# Patient Record
Sex: FEMALE | Race: BLACK OR AFRICAN AMERICAN
[De-identification: names, ages, dates, MRNs, and addresses within clinical notes are randomized per-mention and may not be internally consistent; named-entity substitution may affect disease eponyms.]

---

## 2020-10-17 ENCOUNTER — HOSPITAL ENCOUNTER (EMERGENCY)
Dept: HOSPITAL 63 - ER | Age: 25
Discharge: HOME | End: 2020-10-17
Payer: OTHER GOVERNMENT

## 2020-10-17 VITALS — BODY MASS INDEX: 35.96 KG/M2 | HEIGHT: 61 IN | WEIGHT: 190.48 LBS

## 2020-10-17 VITALS — SYSTOLIC BLOOD PRESSURE: 148 MMHG | DIASTOLIC BLOOD PRESSURE: 81 MMHG
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
## 2020-10-17 DIAGNOSIS — S39.012A: Primary | ICD-10-CM

## 2020-10-17 DIAGNOSIS — M25.562: ICD-10-CM
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
## 2020-10-17 LAB
APTT PPP: (no result) S
BACTERIA #/AREA URNS HPF: 0 /HPF
BILIRUB UR QL STRIP: (no result)
FIBRINOGEN PPP-MCNC: CLEAR MG/DL
GLUCOSE UR STRIP-MCNC: (no result) MG/DL
NITRITE UR QL STRIP: (no result)
RBC #/AREA URNS HPF: (no result) /HPF (ref 0–2)
SP GR UR STRIP: 1.01
SQUAMOUS #/AREA URNS LPF: (no result) /LPF
U PREG PATIENT: NEGATIVE
UROBILINOGEN UR-MCNC: 0.2 MG/DL
WBC #/AREA URNS HPF: (no result) /HPF (ref 0–4)

## 2020-10-17 PROCEDURE — 81025 URINE PREGNANCY TEST: CPT

## 2020-10-17 PROCEDURE — 72100 X-RAY EXAM L-S SPINE 2/3 VWS: CPT

## 2020-10-17 PROCEDURE — 81001 URINALYSIS AUTO W/SCOPE: CPT

## 2020-10-17 PROCEDURE — 73562 X-RAY EXAM OF KNEE 3: CPT

## 2020-10-17 PROCEDURE — 99284 EMERGENCY DEPT VISIT MOD MDM: CPT

## 2020-10-17 NOTE — RAD
Exam: Left knee 3 views

 

INDICATION: Fall, history of meniscus surgery

 

TECHNIQUE: Frontal, lateral and oblique views of the left knee

 

Comparisons: None

 

FINDINGS:

Chronic fracture deformity at the proximal tibia and fibula. Bone 

mineralization is normal. No acute fractures seen. There is a small 

suprapatellar effusion. Joint spaces are well-maintained.

 

IMPRESSION:

No acute osseous abnormality.

 

Electronically signed by: Deanna Mart MD (10/17/2020 7:33 PM) [id number]

## 2020-10-17 NOTE — PHYS DOC
Past History


Past Medical History:  No Pertinent History


 (FLO SAAVEDRA MD)


Past Surgical History:  Other


 (FLO SAAVEDRA MD)


Alcohol Use:  None


 (FLO SAAVEDRA MD)





General Adult


EDM:


Chief Complaint:  MECHANICAL FALL





HPI:


HPI:





25-year-old female with low back pain that radiates down to her left thigh and 

knee.  4 days ago she was pulling a tent and fell onto her back with her leg 

bent to the side.  Did not have much pain at the time but the pain has been 

getting worse and cramping more.  No other injuries, no paresthesias.  Has a 

history of surgery on her left knee in the past.  No history of chronic back 

pain, no changes in urination or hematuria


 (FLO SAAVEDRA MD)





Review of Systems:


Review of Systems:


Constitutional:  Denies fever or chills 


Eyes:  Denies change in visual acuity 


HENT:  Denies nasal congestion or sore throat 


Respiratory:  Denies cough or shortness of breath 


Cardiovascular:  Denies chest pain or edema 


GI:  Denies abdominal pain, nausea, vomiting, bloody stools or diarrhea 


: Denies dysuria 


Musculoskeletal:  Denies back pain or joint pain 


Integument:  Denies rash 


Neurologic:  Denies headache, focal weakness or sensory changes 


Endocrine:  Denies polyuria or polydipsia 


Lymphatic:  Denies swollen glands 


Psychiatric:  Denies depression or anxiety


 (FLO SAAVEDRA MD)





Heart Score:


Risk Factors:


Risk Factors:  DM, Current or recent (<one month) smoker, HTN, HLP, family 

history of CAD, obesity.


Risk Scores:


Score 0 - 3:  2.5% MACE over next 6 weeks - Discharge Home


Score 4 - 6:  20.3% MACE over next 6 weeks - Admit for Clinical Observation


Score 7 - 10:  72.7% MACE over next 6 weeks - Early Invasive Strategies


 (FLO SAAVEDRA MD)





Allergies:


Allergies:





Allergies








Coded Allergies Type Severity Reaction Last Updated Verified


 


  No Known Drug Allergies    10/17/20 No








 (FLO SAAVEDRA MD)





Physical Exam:


PE:





Constitutional: Well developed, well nourished, no acute distress, non-toxic 

appearance. []


HENT: Normocephalic, atraumatic, bilateral external ears normal, oropharynx 

moist, no oral exudates, nose normal. []


Eyes: PERRLA, EOMI, conjunctiva normal, no discharge. [] 


Neck: Normal range of motion, no tenderness, supple, no stridor. [] 


Cardiovascular:Heart rate regular rhythm, no murmur []


Lungs & Thorax:  Bilateral breath sounds clear to auscultation []


Abdomen: Bowel sounds normal, soft, no tenderness, no masses, no pulsatile 

masses. [] 


Skin: Warm, dry, no erythema, no rash. [] 


Back: No tenderness, no CVA tenderness. [] 


Extremities: No tenderness, no cyanosis, no clubbing, ROM intact, no edema. [] 


Neurologic: Alert and oriented X 3, normal motor function, normal sensory f

unction, no focal deficits noted. []


Psychologic: Affect normal, judgement normal, mood normal. []


 (FLO SAAVEDRA MD)





Current Patient Data:


Vital Signs:





                                   Vital Signs








  Date Time  Temp Pulse Resp B/P (MAP) Pulse Ox O2 Delivery O2 Flow Rate FiO2


 


10/17/20 17:10 98.0 82 18 148/81 (103) 100   








 (FLO SAAVEDRA MD)





EKG:


EKG:


[]


 (FLO SAAVEDRA MD)





Radiology/Procedures:


Radiology/Procedures:


[]


 (FLO SAAVEDRA MD)


Impressions:


Exam: Left knee 3 views


 


INDICATION: Fall, history of meniscus surgery


 


TECHNIQUE: Frontal, lateral and oblique views of the left knee


 


Comparisons: None


 


FINDINGS:


Chronic fracture deformity at the proximal tibia and fibula. Bone 


mineralization is normal. No acute fractures seen. There is a small 


suprapatellar effusion. Joint spaces are well-maintained.


 


IMPRESSION:


No acute osseous abnormality.


 


Electronically signed by: Deanna Bella MD (10/17/2020 7:33 PM) [id number]














DICTATED AND SIGNED BY:     DEANNA BELLA MD


DATE:     10/17/20 1933





CC: FLO SAAVEDRA MD; SHERMAN PEARSON DO; PCP,NO ~











Exam: Lumbar spine 3 views


 


INDICATION: Fall


 


TECHNIQUE: Frontal, lateral views of the lumbar spine with spot 


magnification view of the lumbosacral junction.


 


Comparisons: None


 


FINDINGS:


Vertebral body heights and alignment are well-maintained.


 


No significant spondylotic changes lumbar spine.


 


Visualized soft tissues are unremarkable.


 


IMPRESSION:


Unremarkable lumbar spine radiographs.


 


Electronically signed by: Deanna Bella MD (10/17/2020 7:34 PM) [id number]














DICTATED AND SIGNED BY:     DEANNA BELLA MD


DATE:     10/17/20 1934





CC: FLO SAAVEDRA MD; SHERMAN PEARSON DO; PCP,SHARON ~


 (SHERMAN PEARSON DO)


Course & Med Decision Making:


Course & Med Decision Making


Care transitioned at shift change, pending imaging





[]


 (FLO SAAVEDRA MD)


Course & Med Decision Making


The patient's x-rays are negative for acute findings.  She does have chronic 

fractures of the tibia and fibula.  Patient is aware of this.  I believe this 

should improve with rest in a couple of days.  If not I have advised that she 

follow-up with orthopedics at home.  She is going back to [place] tomorrow.  

She is stable for discharge at this time.


 (SHERMAN PEARSON DO)


Dragon Disclaimer:


Dragon Disclaimer:


This electronic medical record was generated, in whole or in part, using a voice

 recognition dictation system.


 (FLO SAAVEDRA MD)





Departure


Departure:


Impression:  


   Primary Impression:  


   Knee pain, left


   Qualified Codes:  M25.562 - Pain in left knee


   Additional Impression:  


   Lumbar strain


   Qualified Codes:  S39.012A - Strain of muscle, fascia and tendon of lower 

   back, initial encounter


Disposition:  01 DC HOME SELF CARE/HOMELESS


Condition:  STABLE


Referrals:  


PCP,SHARON (PCP)


Patient Instructions:  Knee Pain, Easy-to-Read, Low Back Sprain with Rehab-

[facility]











FLO SAAVEDRA MD              Oct 17, 2020 17:37


SHERMAN PEARSON DO                 Oct 17, 2020 20:00

## 2020-10-17 NOTE — RAD
Exam: Lumbar spine 3 views

 

INDICATION: Fall

 

TECHNIQUE: Frontal, lateral views of the lumbar spine with spot 

magnification view of the lumbosacral junction.

 

Comparisons: None

 

FINDINGS:

Vertebral body heights and alignment are well-maintained.

 

No significant spondylotic changes lumbar spine.

 

Visualized soft tissues are unremarkable.

 

IMPRESSION:

Unremarkable lumbar spine radiographs.

 

Electronically signed by: Deanna Mart MD (10/17/2020 7:34 PM) [id number]